# Patient Record
Sex: FEMALE | Race: AMERICAN INDIAN OR ALASKA NATIVE | HISPANIC OR LATINO
[De-identification: names, ages, dates, MRNs, and addresses within clinical notes are randomized per-mention and may not be internally consistent; named-entity substitution may affect disease eponyms.]

---

## 2017-03-01 ENCOUNTER — APPOINTMENT (OUTPATIENT)
Dept: INTERNAL MEDICINE | Facility: CLINIC | Age: 27
End: 2017-03-01

## 2017-03-01 VITALS
HEART RATE: 91 BPM | HEIGHT: 64 IN | DIASTOLIC BLOOD PRESSURE: 60 MMHG | OXYGEN SATURATION: 96 % | WEIGHT: 134 LBS | BODY MASS INDEX: 22.88 KG/M2 | SYSTOLIC BLOOD PRESSURE: 104 MMHG

## 2017-03-06 LAB
ALBUMIN SERPL ELPH-MCNC: 3.4 G/DL
ALP BLD-CCNC: 91 U/L
ALT SERPL-CCNC: 16 U/L
AMYLASE/CREAT SERPL: 40 U/L
ANION GAP SERPL CALC-SCNC: 18 MMOL/L
AST SERPL-CCNC: 24 U/L
BASOPHILS # BLD AUTO: 0.05 K/UL
BASOPHILS NFR BLD AUTO: 0.5 %
BILIRUB SERPL-MCNC: 0.2 MG/DL
BUN SERPL-MCNC: 5 MG/DL
CALCIUM SERPL-MCNC: 9.4 MG/DL
CHLORIDE SERPL-SCNC: 96 MMOL/L
CHOLEST SERPL-MCNC: 100 MG/DL
CHOLEST/HDLC SERPL: 4 RATIO
CO2 SERPL-SCNC: 22 MMOL/L
CREAT SERPL-MCNC: 0.43 MG/DL
EOSINOPHIL # BLD AUTO: 0.14 K/UL
EOSINOPHIL NFR BLD AUTO: 1.5 %
GLUCOSE SERPL-MCNC: 89 MG/DL
HBA1C MFR BLD HPLC: 6.2 %
HCT VFR BLD CALC: 27.8 %
HDLC SERPL-MCNC: 25 MG/DL
HGB BLD-MCNC: 8.5 G/DL
HIV1+2 AB SPEC QL IA.RAPID: NONREACTIVE
IMM GRANULOCYTES NFR BLD AUTO: 0.6 %
LDLC SERPL CALC-MCNC: 45 MG/DL
LPL SERPL-CCNC: 12 U/L
LYMPHOCYTES # BLD AUTO: 3.24 K/UL
LYMPHOCYTES NFR BLD AUTO: 35.1 %
MAN DIFF?: NORMAL
MCHC RBC-ENTMCNC: 25.4 PG
MCHC RBC-ENTMCNC: 30.6 GM/DL
MCV RBC AUTO: 83 FL
MONOCYTES # BLD AUTO: 0.91 K/UL
MONOCYTES NFR BLD AUTO: 9.8 %
NEUTROPHILS # BLD AUTO: 4.84 K/UL
NEUTROPHILS NFR BLD AUTO: 52.5 %
PLATELET # BLD AUTO: 314 K/UL
POTASSIUM SERPL-SCNC: 4.9 MMOL/L
PROT SERPL-MCNC: 7.9 G/DL
RBC # BLD: 3.35 M/UL
RBC # FLD: 14.3 %
SODIUM SERPL-SCNC: 136 MMOL/L
TRIGL SERPL-MCNC: 149 MG/DL
TSH SERPL-ACNC: 1.44 UIU/ML
WBC # FLD AUTO: 9.24 K/UL

## 2017-08-02 ENCOUNTER — APPOINTMENT (OUTPATIENT)
Dept: INTERNAL MEDICINE | Facility: CLINIC | Age: 27
End: 2017-08-02
Payer: COMMERCIAL

## 2017-08-02 VITALS
SYSTOLIC BLOOD PRESSURE: 100 MMHG | WEIGHT: 144 LBS | HEART RATE: 82 BPM | OXYGEN SATURATION: 97 % | BODY MASS INDEX: 24.59 KG/M2 | DIASTOLIC BLOOD PRESSURE: 60 MMHG | HEIGHT: 64 IN

## 2017-08-02 PROCEDURE — 99214 OFFICE O/P EST MOD 30 MIN: CPT

## 2017-08-02 RX ORDER — OXYCODONE HYDROCHLORIDE AND ACETAMINOPHEN 5; 325 MG/1; MG/1
5-325 TABLET ORAL
Refills: 0 | Status: DISCONTINUED | COMMUNITY
End: 2017-08-02

## 2017-08-04 LAB
ALBUMIN SERPL ELPH-MCNC: 4.5 G/DL
ALP BLD-CCNC: 90 U/L
ALT SERPL-CCNC: 18 U/L
AMYLASE/CREAT SERPL: 85 U/L
ANION GAP SERPL CALC-SCNC: 14 MMOL/L
AST SERPL-CCNC: 26 U/L
BILIRUB SERPL-MCNC: 0.4 MG/DL
BUN SERPL-MCNC: 12 MG/DL
CALCIUM SERPL-MCNC: 9.6 MG/DL
CHLORIDE SERPL-SCNC: 103 MMOL/L
CHOLEST SERPL-MCNC: 126 MG/DL
CHOLEST/HDLC SERPL: 2.9 RATIO
CO2 SERPL-SCNC: 21 MMOL/L
CREAT SERPL-MCNC: 0.61 MG/DL
GLUCOSE SERPL-MCNC: 77 MG/DL
HBA1C MFR BLD HPLC: 5.8 %
HDLC SERPL-MCNC: 44 MG/DL
LDLC SERPL CALC-MCNC: 49 MG/DL
LPL SERPL-CCNC: 31 U/L
POTASSIUM SERPL-SCNC: 4.7 MMOL/L
PROT SERPL-MCNC: 7.5 G/DL
SODIUM SERPL-SCNC: 138 MMOL/L
TRIGL SERPL-MCNC: 163 MG/DL

## 2018-01-30 ENCOUNTER — APPOINTMENT (OUTPATIENT)
Dept: ANTEPARTUM | Facility: CLINIC | Age: 28
End: 2018-01-30
Payer: COMMERCIAL

## 2018-01-30 ENCOUNTER — ASOB RESULT (OUTPATIENT)
Age: 28
End: 2018-01-30

## 2018-01-30 PROCEDURE — 76801 OB US < 14 WKS SINGLE FETUS: CPT

## 2018-01-30 PROCEDURE — 76813 OB US NUCHAL MEAS 1 GEST: CPT

## 2018-01-30 PROCEDURE — 36416 COLLJ CAPILLARY BLOOD SPEC: CPT

## 2018-02-02 ENCOUNTER — APPOINTMENT (OUTPATIENT)
Dept: INTERNAL MEDICINE | Facility: CLINIC | Age: 28
End: 2018-02-02
Payer: COMMERCIAL

## 2018-02-02 VITALS
HEART RATE: 60 BPM | HEIGHT: 64 IN | DIASTOLIC BLOOD PRESSURE: 60 MMHG | SYSTOLIC BLOOD PRESSURE: 100 MMHG | OXYGEN SATURATION: 98 % | BODY MASS INDEX: 25.61 KG/M2 | WEIGHT: 150 LBS

## 2018-02-02 PROCEDURE — 99214 OFFICE O/P EST MOD 30 MIN: CPT | Mod: 25

## 2018-02-02 PROCEDURE — G0008: CPT

## 2018-02-02 PROCEDURE — 90686 IIV4 VACC NO PRSV 0.5 ML IM: CPT

## 2018-02-08 ENCOUNTER — APPOINTMENT (OUTPATIENT)
Dept: ANTEPARTUM | Facility: CLINIC | Age: 28
End: 2018-02-08

## 2018-02-08 LAB
ALBUMIN SERPL ELPH-MCNC: 4 G/DL
ALP BLD-CCNC: 66 U/L
ALT SERPL-CCNC: 14 U/L
AMYLASE/CREAT SERPL: 86 U/L
ANION GAP SERPL CALC-SCNC: 14 MMOL/L
AST SERPL-CCNC: 16 U/L
BILIRUB SERPL-MCNC: 0.4 MG/DL
BUN SERPL-MCNC: 7 MG/DL
CALCIUM SERPL-MCNC: 9.7 MG/DL
CHLORIDE SERPL-SCNC: 102 MMOL/L
CHOLEST SERPL-MCNC: 131 MG/DL
CHOLEST/HDLC SERPL: 3.6 RATIO
CO2 SERPL-SCNC: 22 MMOL/L
CREAT SERPL-MCNC: 0.56 MG/DL
GLUCOSE SERPL-MCNC: 80 MG/DL
HBA1C MFR BLD HPLC: 5.6 %
HDLC SERPL-MCNC: 36 MG/DL
LDLC SERPL CALC-MCNC: 23 MG/DL
LPL SERPL-CCNC: 29 U/L
POTASSIUM SERPL-SCNC: 4.5 MMOL/L
PROT SERPL-MCNC: 7.6 G/DL
SODIUM SERPL-SCNC: 138 MMOL/L
TRIGL SERPL-MCNC: 359 MG/DL

## 2018-03-13 ENCOUNTER — ASOB RESULT (OUTPATIENT)
Age: 28
End: 2018-03-13

## 2018-03-13 ENCOUNTER — APPOINTMENT (OUTPATIENT)
Dept: ANTEPARTUM | Facility: CLINIC | Age: 28
End: 2018-03-13
Payer: COMMERCIAL

## 2018-03-13 PROCEDURE — 76811 OB US DETAILED SNGL FETUS: CPT

## 2018-05-02 ENCOUNTER — APPOINTMENT (OUTPATIENT)
Dept: INTERNAL MEDICINE | Facility: CLINIC | Age: 28
End: 2018-05-02
Payer: COMMERCIAL

## 2018-05-02 VITALS
WEIGHT: 166 LBS | SYSTOLIC BLOOD PRESSURE: 110 MMHG | BODY MASS INDEX: 28.34 KG/M2 | DIASTOLIC BLOOD PRESSURE: 70 MMHG | HEART RATE: 64 BPM | HEIGHT: 64 IN | OXYGEN SATURATION: 97 %

## 2018-05-02 DIAGNOSIS — Z92.29 PERSONAL HISTORY OF OTHER DRUG THERAPY: ICD-10-CM

## 2018-05-02 PROCEDURE — 36415 COLL VENOUS BLD VENIPUNCTURE: CPT

## 2018-05-02 PROCEDURE — 99214 OFFICE O/P EST MOD 30 MIN: CPT | Mod: 25

## 2018-05-03 LAB
ALBUMIN SERPL ELPH-MCNC: 3.5 G/DL
ALP BLD-CCNC: 74 U/L
ALT SERPL-CCNC: 9 U/L
ANION GAP SERPL CALC-SCNC: 14 MMOL/L
AST SERPL-CCNC: 16 U/L
BILIRUB SERPL-MCNC: 0.2 MG/DL
BUN SERPL-MCNC: 4 MG/DL
CALCIUM SERPL-MCNC: 9 MG/DL
CHLORIDE SERPL-SCNC: 101 MMOL/L
CHOLEST SERPL-MCNC: 181 MG/DL
CHOLEST/HDLC SERPL: 4.6 RATIO
CO2 SERPL-SCNC: 19 MMOL/L
CREAT SERPL-MCNC: 0.52 MG/DL
GLUCOSE SERPL-MCNC: 71 MG/DL
HBA1C MFR BLD HPLC: 5.1 %
HDLC SERPL-MCNC: 39 MG/DL
LDLC SERPL CALC-MCNC: NORMAL
POTASSIUM SERPL-SCNC: 4.4 MMOL/L
PROT SERPL-MCNC: 7.1 G/DL
SODIUM SERPL-SCNC: 134 MMOL/L
TRIGL SERPL-MCNC: 613 MG/DL

## 2018-06-20 ENCOUNTER — ASOB RESULT (OUTPATIENT)
Age: 28
End: 2018-06-20

## 2018-06-20 ENCOUNTER — APPOINTMENT (OUTPATIENT)
Dept: ANTEPARTUM | Facility: CLINIC | Age: 28
End: 2018-06-20
Payer: COMMERCIAL

## 2018-06-20 PROCEDURE — 76819 FETAL BIOPHYS PROFIL W/O NST: CPT

## 2018-06-20 PROCEDURE — 76816 OB US FOLLOW-UP PER FETUS: CPT

## 2018-09-26 ENCOUNTER — APPOINTMENT (OUTPATIENT)
Dept: INTERNAL MEDICINE | Facility: CLINIC | Age: 28
End: 2018-09-26
Payer: COMMERCIAL

## 2018-09-26 VITALS
WEIGHT: 167 LBS | HEART RATE: 65 BPM | BODY MASS INDEX: 28.51 KG/M2 | HEIGHT: 64 IN | SYSTOLIC BLOOD PRESSURE: 120 MMHG | OXYGEN SATURATION: 98 % | DIASTOLIC BLOOD PRESSURE: 70 MMHG

## 2018-09-26 PROCEDURE — 99215 OFFICE O/P EST HI 40 MIN: CPT

## 2018-09-27 NOTE — HISTORY OF PRESENT ILLNESS
[Spouse] : spouse [FreeTextEntry1] : CPE [de-identified] : \par Ms. Williamson is a 27 y/o female presents for CPE\par PMH: Hypertriglyceridemia \par Recently underwent CSection - 6 weeks ago; \par C section in August but still experiencing pain when ambulating; pain also when coughing / sneezing; pain at worse 8/10 - taking tylenol for pain with some relief but not complete relief; \par \par Previous pseudocyst surgery c/b hernia post-op\par also post prandial pain recently more so than usual\par \par on further query, pt reports symptoms of anxiety myesha related to  baby - she reports being anxious about SIDS - no prev hx of SIDS in the family; she reports she is unable to sleep more than 2 hours at night because wakes up to check on the baby;  is working 1AM to 12PM and is available to help with baby during the day; but no other support in the area - family or friends. \par patient admits that she has been also experiencing depression - inability to focus on task, thoguhts of failure as a wife and mother; had a fleeting thought about harming herself but then realized that kids would be alone and she did not want that. No personal or family hx of depression, suicide, anxiety. \par \par

## 2018-09-27 NOTE — PHYSICAL EXAM
[Normal] : normal rate, regular rhythm, normal S1/S2, no murmur [de-identified] : tearful [de-identified] : tenderness along the suture line w/o exudates or abscess evident  [de-identified] : Normal affect, appropriate moood - PHQ 9 completed = 11 w/ very difficult

## 2018-09-27 NOTE — PLAN
[FreeTextEntry1] : \par \par Ms. Williamson is a 27 y/o female present for abd pain and CPE, also with new onset post-partum depression\par \par 1. Postpartum depression: extensive counseling about symptoms and likelihood of worsening symptoms at times; discussion about importance of counseling and potentially starting medication - pt is currently breastfeeding and she defers medication at this time; on detailed questioning, it is apparent that the patient did not have a plan nor does she have a future plan; I attempted to get her a  appt with our clinic however no one available until 1 week; pt and  given detailed information for [facility]. Pt is defering medication at this time; Patient and  counseled to call MD if new symptoms develop or worsen or go to ED.\par \par 2. Postop pain: CT Abd for r/o abscess or hernia formation \par \par 3. HLD: check lipids and CMP today

## 2018-10-01 LAB
ALBUMIN SERPL ELPH-MCNC: 4.8 G/DL
ALP BLD-CCNC: 99 U/L
ALT SERPL-CCNC: 54 U/L
ANION GAP SERPL CALC-SCNC: 15 MMOL/L
AST SERPL-CCNC: 39 U/L
BILIRUB SERPL-MCNC: 0.4 MG/DL
BUN SERPL-MCNC: 11 MG/DL
CALCIUM SERPL-MCNC: 10.1 MG/DL
CHLORIDE SERPL-SCNC: 102 MMOL/L
CHOLEST SERPL-MCNC: 196 MG/DL
CHOLEST/HDLC SERPL: 5.8 RATIO
CO2 SERPL-SCNC: 24 MMOL/L
CREAT SERPL-MCNC: 0.61 MG/DL
GLUCOSE SERPL-MCNC: 88 MG/DL
HBA1C MFR BLD HPLC: 5.6 %
HDLC SERPL-MCNC: 34 MG/DL
LDLC SERPL CALC-MCNC: NORMAL
LDLC SERPL DIRECT ASSAY-MCNC: 50 MG/DL
POTASSIUM SERPL-SCNC: 4.9 MMOL/L
PROT SERPL-MCNC: 8 G/DL
SODIUM SERPL-SCNC: 141 MMOL/L
TRIGL SERPL-MCNC: 583 MG/DL
TSH SERPL-ACNC: 1.9 UIU/ML

## 2018-10-03 ENCOUNTER — APPOINTMENT (OUTPATIENT)
Dept: INTERNAL MEDICINE | Facility: CLINIC | Age: 28
End: 2018-10-03
Payer: COMMERCIAL

## 2018-10-03 VITALS
WEIGHT: 168 LBS | DIASTOLIC BLOOD PRESSURE: 60 MMHG | OXYGEN SATURATION: 98 % | HEART RATE: 68 BPM | HEIGHT: 64 IN | BODY MASS INDEX: 28.68 KG/M2 | SYSTOLIC BLOOD PRESSURE: 100 MMHG

## 2018-10-03 PROCEDURE — 99215 OFFICE O/P EST HI 40 MIN: CPT

## 2018-10-04 NOTE — HISTORY OF PRESENT ILLNESS
[Spouse] : spouse [FreeTextEntry1] : Follow up of depression [de-identified] : \par Ms. Williamson presents today for follow up of depression\par She reports that since the last visit she has reached out to [facility] and is on the wait list to be called for a therapist appt this week. \par She reports persistent intermittent sleep - usually get a total of 5 hours - but only 2 hours at a time; does not sleep during the day; has \par Has not had further thoughts about harm since our last visit\par \rickey Feels overwhelmed and guilty often - myesha when she realizes she has limited time to spend with her daughter (older) due to  needs; has family in area - but feels pressured from them to adv her baby on foods and thus limits her interaction in order to decrease level of pressure. Also, continues to feel that she is not a good mother and worries about the health of her baby - worries about SIDS continues which causes her to wake up at night to check-in on her baby. \par

## 2018-10-04 NOTE — PHYSICAL EXAM
[de-identified] : She is tearful but cntrolled in her narration;  [de-identified] : normal affect; appropriate expression and mood; denies SI or HI at this time

## 2018-10-04 NOTE — PLAN
[FreeTextEntry1] : \par Ms. Williamson presents for follow up of post partum depression\par She seems to be stable in the severity of depression at this time, no further SI or HI at this time\par I encouraged her to follow up with [facility] closely; Pt counseled to call MD if new symptoms develop or worsen. \par We discussed utility of paxil in post partum depression - it has been shown to have lower concentration in breast milk however she is concerned that she is not ready to stop breastfeeding at this time. She would like to wait at least 1-2 months prior to stopping breastfeeding. \par We discussed details about coping with stress - discussed importance of break times; finding a quiet place to take 2-3 mins to recuperate; importance of noting anxiety episodes and discussing with therapist; offered help in identifying psychiatrist to assist in therapy prescribing.

## 2018-10-16 DIAGNOSIS — Z87.19 PERSONAL HISTORY OF OTHER DISEASES OF THE DIGESTIVE SYSTEM: ICD-10-CM

## 2018-10-24 ENCOUNTER — APPOINTMENT (OUTPATIENT)
Dept: INTERNAL MEDICINE | Facility: CLINIC | Age: 28
End: 2018-10-24
Payer: COMMERCIAL

## 2018-10-24 VITALS
WEIGHT: 166 LBS | BODY MASS INDEX: 28.34 KG/M2 | HEART RATE: 67 BPM | HEIGHT: 64 IN | TEMPERATURE: 98.6 F | DIASTOLIC BLOOD PRESSURE: 60 MMHG | SYSTOLIC BLOOD PRESSURE: 98 MMHG | OXYGEN SATURATION: 99 %

## 2018-10-24 DIAGNOSIS — G89.18 OTHER ACUTE POSTPROCEDURAL PAIN: ICD-10-CM

## 2018-10-24 DIAGNOSIS — J45.901 UNSPECIFIED ASTHMA WITH (ACUTE) EXACERBATION: ICD-10-CM

## 2018-10-24 PROCEDURE — 99214 OFFICE O/P EST MOD 30 MIN: CPT

## 2018-10-25 PROBLEM — J45.901 ASTHMA EXACERBATION: Status: RESOLVED | Noted: 2017-03-01 | Resolved: 2018-10-25

## 2018-10-25 PROBLEM — G89.18 POST-OP PAIN: Status: RESOLVED | Noted: 2018-09-26 | Resolved: 2018-10-25

## 2018-10-25 NOTE — HISTORY OF PRESENT ILLNESS
[FreeTextEntry1] : Postpartum depression follow up [de-identified] : Ms. Williamson presents today for Postpartum depression follow up and to discuss her CT Scan\par \par She reports seeing psychologist last week and this week at [facility]; feels that she has a better mood and more energy; feels much less anxious about her baby; has restarted work and is able to concentrate at work and tasks without issues; denies SI or HI at this time; reports leaning upon her niece to provide ; has also been encouraging her older daugther to take part in childcare ie) feeding bottles to the baby under supervision. Reports sleeping deeper and more soundly - does not wake up to check on baby anymore; able to catch up to sleep when child is sleeping during the day.\par \par Discussed findings of CT scan that the previous cyst has resolved, however new finding of 3cm inflammatory area requiring further testing; she reports feeling bloated after eating or feels that there is liquid swishing around in her stomach; at times if she eats a larger meal, she feels nauseous; denies constipation or inability to pass flatus; CSection area is better - healing well; less pain

## 2018-10-25 NOTE — PHYSICAL EXAM
[Normal] : normoactive bowel sounds, soft and nontender, no hepatosplenomegaly or masses appreciated [de-identified] : slight pain around abd hernia area which just sub-epigastric area

## 2018-10-25 NOTE — PLAN
[FreeTextEntry1] : Ms. Williamson presents today for Postpartum depression follow up and to discuss her CT Scan\par \par 1. Postpartum depression: much improved from last visit; will hold SSRI at this time; encouraged her to cont follow up with psychologist ; Pt counseled to call MD if new symptoms develop or worsen; \par \par 2. Abd bloating: trial of simethicone to combat bloating sensation; \par \par 3. Pancreatic cyst: Discussed findings of CT scan that the previous cyst has resolved, however new finding of 3cm inflammatory area requiring further testing;  to work on obtaining prior auth for MRI; Pt counseled to call MD if new symptoms develop or worsen. \par \par RTO in 1 month.

## 2019-04-16 ENCOUNTER — APPOINTMENT (OUTPATIENT)
Dept: INTERNAL MEDICINE | Facility: CLINIC | Age: 29
End: 2019-04-16

## 2019-09-10 ENCOUNTER — APPOINTMENT (OUTPATIENT)
Dept: INTERNAL MEDICINE | Facility: CLINIC | Age: 29
End: 2019-09-10
Payer: COMMERCIAL

## 2019-09-10 VITALS
HEIGHT: 64 IN | BODY MASS INDEX: 27.66 KG/M2 | SYSTOLIC BLOOD PRESSURE: 100 MMHG | DIASTOLIC BLOOD PRESSURE: 60 MMHG | WEIGHT: 162 LBS | OXYGEN SATURATION: 98 % | HEART RATE: 69 BPM

## 2019-09-10 VITALS — TEMPERATURE: 98.3 F

## 2019-09-10 PROCEDURE — 99214 OFFICE O/P EST MOD 30 MIN: CPT

## 2019-09-11 NOTE — HISTORY OF PRESENT ILLNESS
[FreeTextEntry8] : \par Ms. Williamson presents with R hip pain today\par R hip pain - started 3 days ago\par pain starts at hip and radiates to LLE\par paresthesias and numbness to right leg and foot\par Mild weakness to the Right hip with feeling of "being stuck"\par Pain in posterior gluteal when walking \par Also pain after prolonged sitting\par Tylenol 500mg x 3 tabs; tried Percocet \par No similar symptoms in the past\par No trauma in the past few weeks; no falls\par \par Of note 101F last night; denies dysuria or hematuria; denies incontinence of bowel or urine; \par \par \par

## 2019-09-11 NOTE — PLAN
[FreeTextEntry1] : \par Ms. Williamson presents with R hip pain today\par musculoskeletal in nature, etiology unlikely to be spinal origin, possibly nerve entrapment vs. pyriformis syndrome; \par REcommend NSAIDs with meal (Aleve) and Tylenol intermittently if pain worsens.\par Start meloxicam after dinner for pain relief on laying down\par Physical therapy referral\par PMR referral given for possible steroid injection for relief and possibly other therapy recommendation\par \par all questions answered, patient amenable to plan above \par Pt counseled to call MD if new symptoms develop or worsen. \par \par RTO in 4 weeks for follow up. \par

## 2019-09-11 NOTE — PHYSICAL EXAM
[Normal] : no acute distress, well nourished, well developed and well-appearing [No CVA Tenderness] : no CVA  tenderness [No Spinal Tenderness] : no spinal tenderness [No Focal Deficits] : no focal deficits [de-identified] : + pain on straight leg raise however pain NOT in back, only in central aspect of R gluteus; + pain on bearing weight on RLE in gluteal region;  [de-identified] : gait leaning towards left side given pain on ambulation in right hip

## 2019-09-19 ENCOUNTER — APPOINTMENT (OUTPATIENT)
Dept: INTERNAL MEDICINE | Facility: CLINIC | Age: 29
End: 2019-09-19
Payer: COMMERCIAL

## 2019-09-19 VITALS
DIASTOLIC BLOOD PRESSURE: 60 MMHG | HEIGHT: 64 IN | HEART RATE: 68 BPM | OXYGEN SATURATION: 99 % | SYSTOLIC BLOOD PRESSURE: 100 MMHG | WEIGHT: 162 LBS | BODY MASS INDEX: 27.66 KG/M2

## 2019-09-19 DIAGNOSIS — F32.9 ANXIETY DISORDER, UNSPECIFIED: ICD-10-CM

## 2019-09-19 DIAGNOSIS — J45.909 UNSPECIFIED ASTHMA, UNCOMPLICATED: ICD-10-CM

## 2019-09-19 DIAGNOSIS — F41.9 ANXIETY DISORDER, UNSPECIFIED: ICD-10-CM

## 2019-09-19 PROCEDURE — 99214 OFFICE O/P EST MOD 30 MIN: CPT

## 2019-09-19 RX ORDER — ALBUTEROL SULFATE 90 UG/1
108 (90 BASE) AEROSOL, METERED RESPIRATORY (INHALATION) EVERY 6 HOURS
Qty: 1 | Refills: 2 | Status: DISCONTINUED | COMMUNITY
Start: 2017-03-01 | End: 2019-09-19

## 2019-09-19 RX ORDER — ICOSAPENT ETHYL 1000 MG/1
1 CAPSULE ORAL
Refills: 0 | Status: ACTIVE | COMMUNITY

## 2019-09-19 RX ORDER — ALBUTEROL SULFATE 90 UG/1
108 (90 BASE) POWDER, METERED RESPIRATORY (INHALATION)
Qty: 1 | Refills: 3 | Status: ACTIVE | COMMUNITY
Start: 2019-09-19 | End: 1900-01-01

## 2019-09-19 RX ORDER — PANCRELIPASE 36000; 180000; 114000 [USP'U]/1; [USP'U]/1; [USP'U]/1
36000-114000 CAPSULE, DELAYED RELEASE PELLETS ORAL
Refills: 0 | Status: ACTIVE | COMMUNITY

## 2019-09-19 NOTE — HISTORY OF PRESENT ILLNESS
[FreeTextEntry1] : Follow up [de-identified] : \par Ms. Williamson returns today \par After 1 week ago for R. 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 Ronaldo Saxena\par

## 2019-09-19 NOTE — PLAN
[FreeTextEntry1] : \par \par Ms. Williamson returns today with asthma and follow up of Hyper TG\par PMH: pancreatic cyst, HyperTG\par \par 1. Hypertriglycerdemia : check lipids today with direct LDL; cont with meds for now; will obtain notes from GI; \par \par 2. Asthma vs .Reactive airway: given known allergen, likely reactive airway disease; trial of claritin OTC daily with proAir\par \par Pt counseled to call MD if new symptoms develop or worsen. \par all questions answered, patient amenable to plan above \par RTO in 3-4 months

## 2019-09-24 LAB
ALBUMIN SERPL ELPH-MCNC: 4.8 G/DL
ALP BLD-CCNC: 77 U/L
ALT SERPL-CCNC: 17 U/L
ANION GAP SERPL CALC-SCNC: 12 MMOL/L
AST SERPL-CCNC: 16 U/L
BILIRUB SERPL-MCNC: 0.2 MG/DL
BUN SERPL-MCNC: 8 MG/DL
CALCIUM SERPL-MCNC: 9.8 MG/DL
CHLORIDE SERPL-SCNC: 105 MMOL/L
CHOLEST SERPL-MCNC: 142 MG/DL
CHOLEST/HDLC SERPL: 3.7 RATIO
CO2 SERPL-SCNC: 24 MMOL/L
CREAT SERPL-MCNC: 0.55 MG/DL
GLUCOSE SERPL-MCNC: 99 MG/DL
HDLC SERPL-MCNC: 38 MG/DL
LDLC SERPL CALC-MCNC: 74 MG/DL
LDLC SERPL DIRECT ASSAY-MCNC: 88 MG/DL
POTASSIUM SERPL-SCNC: 4.3 MMOL/L
PROT SERPL-MCNC: 7.5 G/DL
SODIUM SERPL-SCNC: 141 MMOL/L
TRIGL SERPL-MCNC: 151 MG/DL

## 2019-12-18 ENCOUNTER — TRANSCRIPTION ENCOUNTER (OUTPATIENT)
Age: 29
End: 2019-12-18

## 2019-12-18 ENCOUNTER — APPOINTMENT (OUTPATIENT)
Dept: INTERNAL MEDICINE | Facility: CLINIC | Age: 29
End: 2019-12-18

## 2020-01-22 ENCOUNTER — APPOINTMENT (OUTPATIENT)
Dept: INTERNAL MEDICINE | Facility: CLINIC | Age: 30
End: 2020-01-22

## 2020-01-22 ENCOUNTER — APPOINTMENT (OUTPATIENT)
Dept: INTERNAL MEDICINE | Facility: CLINIC | Age: 30
End: 2020-01-22
Payer: COMMERCIAL

## 2020-01-22 VITALS
DIASTOLIC BLOOD PRESSURE: 60 MMHG | SYSTOLIC BLOOD PRESSURE: 98 MMHG | TEMPERATURE: 97.4 F | OXYGEN SATURATION: 99 % | HEART RATE: 59 BPM | BODY MASS INDEX: 26.29 KG/M2 | WEIGHT: 154 LBS | HEIGHT: 64 IN

## 2020-01-22 DIAGNOSIS — K86.2 CYST OF PANCREAS: ICD-10-CM

## 2020-01-22 DIAGNOSIS — Z00.00 ENCOUNTER FOR GENERAL ADULT MEDICAL EXAMINATION W/OUT ABNORMAL FINDINGS: ICD-10-CM

## 2020-01-22 DIAGNOSIS — E78.1 PURE HYPERGLYCERIDEMIA: ICD-10-CM

## 2020-01-22 DIAGNOSIS — G57.01 LESION OF SCIATIC NERVE, RIGHT LOWER LIMB: ICD-10-CM

## 2020-01-22 DIAGNOSIS — R14.0 ABDOMINAL DISTENSION (GASEOUS): ICD-10-CM

## 2020-01-22 DIAGNOSIS — Z83.3 FAMILY HISTORY OF DIABETES MELLITUS: ICD-10-CM

## 2020-01-22 PROCEDURE — 99395 PREV VISIT EST AGE 18-39: CPT | Mod: 25

## 2020-01-22 PROCEDURE — G0444 DEPRESSION SCREEN ANNUAL: CPT | Mod: 26,NC

## 2020-01-22 RX ORDER — SIMETHICONE 80 MG/1
80 TABLET, CHEWABLE ORAL 4 TIMES DAILY
Qty: 30 | Refills: 0 | Status: DISCONTINUED | COMMUNITY
Start: 2018-10-24 | End: 2020-01-22

## 2020-01-22 RX ORDER — ASCORBIC ACID, BIOTIN, CHOLECALCIFEROL, CYANOCOBALAMIN, FOLIC ACID, FERROUS ASPARTO GLYCINATE, POTASSIUM IODIDE, PYRIDOXINE HYDROCHLORIDE, .ALPHA.-TOCOPHEROL ACETATE, DL-, DOCUSATE SODIUM AND BLUEBERRY 30; 75; 500; 13; 400; 10; 150; 5; 10; 200; 5; 600 MG/1; UG/1; [IU]/1; UG/1; UG/1; MG/1; UG/1; MG/1; [IU]/1; MG/1; MG/1; UG/1
10-0.6-0.4-2 TABLET, FILM COATED ORAL
Qty: 90 | Refills: 0 | Status: DISCONTINUED | COMMUNITY
Start: 2017-12-27 | End: 2020-01-22

## 2020-01-22 RX ORDER — MELOXICAM 7.5 MG/1
7.5 TABLET ORAL
Qty: 30 | Refills: 0 | Status: DISCONTINUED | COMMUNITY
Start: 2019-09-10 | End: 2020-01-22

## 2020-01-22 RX ORDER — NAPROXEN 250 MG/1
250 TABLET ORAL
Qty: 60 | Refills: 0 | Status: DISCONTINUED | COMMUNITY
Start: 2019-09-10 | End: 2020-01-22

## 2020-01-22 NOTE — HISTORY OF PRESENT ILLNESS
[FreeTextEntry1] : CPE [de-identified] : \par Ms. Williamson is a 28 y/o female \par PMH: HyperTG, Pancreatic Cyst, Reactive airway disease\par \par Acute concerns: none\par \par Household: , 2 daughter, niece 21 y/o ; no pets\par \par Occupation: [employer]\par \par Denies: chest pain, palpitations, headaches, shortness of breath (w/ or w/o exertion), vision or hearing changes, peripheral edema, fever, chills, coughs, joint pains, hematochezia, melena, nausea, vomiting, rashes, moles changing color.

## 2020-01-22 NOTE — HEALTH RISK ASSESSMENT
[No] : No [0] : 2) Feeling down, depressed, or hopeless: Not at all (0) [Patient reported PAP Smear was normal] : Patient reported PAP Smear was normal [] : No [Audit-CScore] : 0 [[id number]] : 0 [PapSmearDate] : 01/18

## 2020-01-23 LAB
ALBUMIN SERPL ELPH-MCNC: 4.4 G/DL
ALP BLD-CCNC: 80 U/L
ALT SERPL-CCNC: 9 U/L
ANION GAP SERPL CALC-SCNC: 12 MMOL/L
AST SERPL-CCNC: 16 U/L
BILIRUB SERPL-MCNC: 0.2 MG/DL
BUN SERPL-MCNC: 9 MG/DL
CALCIUM SERPL-MCNC: 9.4 MG/DL
CHLORIDE SERPL-SCNC: 103 MMOL/L
CHOLEST SERPL-MCNC: 133 MG/DL
CHOLEST/HDLC SERPL: 3.4 RATIO
CO2 SERPL-SCNC: 23 MMOL/L
CREAT SERPL-MCNC: 0.52 MG/DL
ESTIMATED AVERAGE GLUCOSE: 123 MG/DL
GLUCOSE SERPL-MCNC: 82 MG/DL
HBA1C MFR BLD HPLC: 5.9 %
HDLC SERPL-MCNC: 39 MG/DL
LDLC SERPL CALC-MCNC: 58 MG/DL
LDLC SERPL DIRECT ASSAY-MCNC: 67 MG/DL
POTASSIUM SERPL-SCNC: 5 MMOL/L
PROT SERPL-MCNC: 7.3 G/DL
SODIUM SERPL-SCNC: 138 MMOL/L
TRIGL SERPL-MCNC: 180 MG/DL

## 2020-03-28 ENCOUNTER — APPOINTMENT (OUTPATIENT)
Dept: DISASTER EMERGENCY | Facility: CLINIC | Age: 30
End: 2020-03-28
Payer: COMMERCIAL

## 2020-03-28 ENCOUNTER — LABORATORY RESULT (OUTPATIENT)
Age: 30
End: 2020-03-28

## 2020-03-28 VITALS
OXYGEN SATURATION: 97 % | SYSTOLIC BLOOD PRESSURE: 98 MMHG | DIASTOLIC BLOOD PRESSURE: 66 MMHG | HEART RATE: 76 BPM | RESPIRATION RATE: 16 BRPM | TEMPERATURE: 98.3 F

## 2020-03-28 DIAGNOSIS — U07.1 COVID-19: ICD-10-CM

## 2020-03-28 PROCEDURE — 99214 OFFICE O/P EST MOD 30 MIN: CPT

## 2020-03-28 NOTE — HISTORY OF PRESENT ILLNESS
[___ Days ago] : [unfilled] days ago [Sudden] : suddenly [Paroxysmal] : paroxysmal [Congestion] : congestion [Cough] : cough [Fever] : fever [Tmax= ___] : Tmax = [unfilled] [OTC Remedies] : OTC remedies [FreeTextEntry2] : exposed to covid 19 1.5 weeks ago @ work [FreeTextEntry5] : tylenol [FreeTextEntry8] : took tylenol today

## 2020-03-28 NOTE — PHYSICAL EXAM
[Normal] : normal sclera/conjunctiva, pupils are equal, round and reactive to light and extraocular movements are intact [No Respiratory Distress] : no respiratory distress  [Clear to Auscultation] : lungs were clear to auscultation bilaterally [Normal Rate] : normal rate  [Regular Rhythm] : with a regular rhythm [Coordination Grossly Intact] : coordination grossly intact [Normal Gait] : normal gait [Speech Grossly Normal] : speech grossly normal [Alert and Oriented x3] : oriented to person, place, and time

## 2020-04-02 PROBLEM — U07.1 INFECTION DUE TO 2019 NOVEL CORONAVIRUS: Status: ACTIVE | Noted: 2020-03-28

## 2020-10-12 RX ORDER — GEMFIBROZIL 600 MG/1
600 TABLET, FILM COATED ORAL TWICE DAILY
Qty: 180 | Refills: 2 | Status: ACTIVE | COMMUNITY
Start: 1900-01-01 | End: 1900-01-01

## 2023-01-17 ENCOUNTER — APPOINTMENT (OUTPATIENT)
Dept: ORTHOPEDIC SURGERY | Facility: CLINIC | Age: 33
End: 2023-01-17
Payer: OTHER MISCELLANEOUS

## 2023-01-17 VITALS — WEIGHT: 160 LBS | BODY MASS INDEX: 27.31 KG/M2 | HEIGHT: 64 IN

## 2023-01-17 DIAGNOSIS — M17.11 UNILATERAL PRIMARY OSTEOARTHRITIS, RIGHT KNEE: ICD-10-CM

## 2023-01-17 DIAGNOSIS — M23.306 OTHER MENISCUS DERANGEMENTS, UNSPECIFIED MENISCUS, RIGHT KNEE: ICD-10-CM

## 2023-01-17 PROCEDURE — 20610 DRAIN/INJ JOINT/BURSA W/O US: CPT | Mod: RT

## 2023-01-17 PROCEDURE — 99072 ADDL SUPL MATRL&STAF TM PHE: CPT

## 2023-01-17 PROCEDURE — 99214 OFFICE O/P EST MOD 30 MIN: CPT | Mod: 25

## 2023-01-17 NOTE — DATA REVIEWED
[MRI] : MRI [Right] : of the right [Knee] : knee [I independently reviewed and interpreted images and report] : I independently reviewed and interpreted images and report [FreeTextEntry1] : right lat menisucs tear. lateral compartment mod djd, pf mild djd

## 2023-01-17 NOTE — IMAGING
[de-identified] : \par ----------------------------------------------------------------------------\par \par Bilateral knee exam: \par \par Inspection: anterior Right knee dislocation, bruised injured skin. \par    (-) Effusion\par    (-) Malalignment\par    (-) Swelling\par    (-) Quad atrophy\par    (-) J-sign\par ROM: \par    0 - 135 degrees of flexion.\par Tenderness: \par    +MJLT\par    (-) LJLT\par    +Medial patellar facet tenderness\par    +Lateral patellar facet tenderness\par    (-) Crepitus\par    (-) Patellar grind tenderness\par    (-) Patellar tendon\par    (-) Quad tendon\par    Other: \par Stability: \par    (-) Lachman\par    (-) Varus/Valgus instability\par    (-) Posterior drawer\par    (-) Patellar translation: wnl\par Additional tests: \par    (-) McMurrays test\par    (-) Patellar apprehension\par    Other: \par Strength: 5/5 Q/H/TA/GS/EHL\par Neuro: In tact to light touch throughout, DTR's wnl\par Vascularity: Extremity warm and well perfused\par Gait: normal\par \par

## 2023-01-17 NOTE — HISTORY OF PRESENT ILLNESS
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 TORO TREVIÑO  a 32 year old female who comes in today complaining of bilateral knee and right shoulder pain since falling at work on 11/6/22.  notes she got out of the car and tripped over a ramp and fel forward landing on her knees and right hand. she was iniitally bleeding and she treated with first aid kit and continued to work. pain worsened after that. she then saw a doctor and had xray and mri and has been doing PT. she has been out of work since injury. notes pain feels like burning sensation. R>L knee anteiro pain radiates down.  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

## 2023-01-17 NOTE — WORK
[Sprain/Strain] : sprain/strain [Was the competent medical cause of the injury] : was the competent medical cause of the injury [Are consistent with the injury] : are consistent with the injury [Consistent with my objective findings] : consistent with my objective findings [Total] : total [Does not reveal pre-existing condition(s) that may affect treatment/prognosis] : does not reveal pre-existing condition(s) that may affect treatment/prognosis

## 2023-01-17 NOTE — DISCUSSION/SUMMARY
[de-identified] : discused mri shows lateral meniscus tear and latearl oa. discussed, pain is anterior and medial and we would try csi prior to any arthroscopic intervention. csi right knee today. fu 2-3w. continue PT\par \par ----------------------------------------------------------------------------\par \par The patient was advised of the diagnosis.  The natural history of the pathology was explained in full. All questions were answered.  The risks and benefits of conservative and interventional treatment alternatives were explained to the patient\par \par \par ----------------------------------------------------------------------------\par \par Large joint corticosteroid injection given: Right knee\par \par Patient indicated for injection after trial of rest, OTC medications including aspirin, Ibuprofen, Aleve etc or prescription NSAIDS, and/or exercises at home and/ or physical therapy without satisfactory response.  Patient has symptoms including pain, swelling, and/or decreased mobility in the joint. The risks, benefits, and alternatives to corticosteroid injection were explained in full to the patient, including but not limited to infection, sepsis, bleeding, scarring, skin discoloration, temporary increase in pain, syncopal episode, failure to resolve symptoms, allergic reaction, symptom recurrence, and elevation of blood sugar in diabetics. Patient understood the risks. All questions were answered. After discussion of options, patient requested an injection. \par \par Oral informed consent was obtained and sterile technique was utilized for the procedure including the preparation of the solutions used for the injection and betadine followed by alcohol prep to the injection site. Anesthesia was given with ethyl chloride sprayed topically. The injection was delivered. Patient tolerated the procedure well. \par \par Post Procedure Instructions: Patient was advised to call if redness, pain, or fever occur and apply ice for 15 min on and 15 min off later today\par \par Medications delivered: Kenalog 10 mg, Lidocaine: 4 cc\par

## 2024-10-30 NOTE — PLAN
No [FreeTextEntry1] :  Stated that testing results may take between 5-7 days to become available. The lab will contact the patient with the results. If the test is positive advised TORO to continue home isolation until they they are completely well with no fever and it has been at least 14 days since last positive test. If the test is negative. They will be able to stop home isolation and resume standard precautions. Referred further questions to [phone]\par \par Recommended precautionary steps from now until 14 days from when they returned from travel date or from last known possible contact: Do not go to work, school or public areas. Avoid using public transportation taxis and ride sharing. As much as possible separate themselves from other people at home. Wear supplied mask when ever they are around other people. Reschedule non-urgent medical appointments to another date. Wash hands with soap and water for at least 20 seconds or use an alcohol based  that contains 60-95% alcohol. covering all surfaces until dry. Cover mouth and nose with tissue when they cough or sneeze, throw tissue in trans and wash hands. Avoid touching eyes, mouth and nose. with hands. Avoid sharing personal items such as dishes, drinking glasses, cups, eating utensils, towels and bedding with other people. Clean and disinfect all high touch surfaces.\par \par Advised TORO of signs of decompensation s to be watched for at home and seek immediate medical care if they occur such as: chest pain, severe shortness of breath. worsening shortness of breath,with minimal exertion, new or worsening wheezing, dizziness on standing or generally feeling worse. \par \par TORO Voiced understanding.\par